# Patient Record
Sex: FEMALE | Race: BLACK OR AFRICAN AMERICAN | NOT HISPANIC OR LATINO | Employment: FULL TIME | URBAN - METROPOLITAN AREA
[De-identification: names, ages, dates, MRNs, and addresses within clinical notes are randomized per-mention and may not be internally consistent; named-entity substitution may affect disease eponyms.]

---

## 2022-07-25 LAB
ALBUMIN SERPL-MCNC: 3.5 G/DL (ref 3.4–5)
ALP SERPL-CCNC: 84 U/L (ref 40–150)
ALT SERPL W P-5'-P-CCNC: 18 U/L (ref 0–50)
ANION GAP SERPL CALCULATED.3IONS-SCNC: 4 MMOL/L (ref 3–14)
AST SERPL W P-5'-P-CCNC: 13 U/L (ref 0–45)
ATRIAL RATE - MUSE: 97 BPM
BASOPHILS # BLD AUTO: 0 10E3/UL (ref 0–0.2)
BASOPHILS NFR BLD AUTO: 0 %
BILIRUB SERPL-MCNC: 0.4 MG/DL (ref 0.2–1.3)
BUN SERPL-MCNC: 9 MG/DL (ref 7–30)
CALCIUM SERPL-MCNC: 8.8 MG/DL (ref 8.5–10.1)
CHLORIDE BLD-SCNC: 106 MMOL/L (ref 94–109)
CO2 SERPL-SCNC: 28 MMOL/L (ref 20–32)
CREAT SERPL-MCNC: 0.77 MG/DL (ref 0.52–1.04)
DIASTOLIC BLOOD PRESSURE - MUSE: NORMAL MMHG
EOSINOPHIL # BLD AUTO: 0.1 10E3/UL (ref 0–0.7)
EOSINOPHIL NFR BLD AUTO: 1 %
ERYTHROCYTE [DISTWIDTH] IN BLOOD BY AUTOMATED COUNT: 17.1 % (ref 10–15)
GFR SERPL CREATININE-BSD FRML MDRD: >90 ML/MIN/1.73M2
GLUCOSE BLD-MCNC: 118 MG/DL (ref 70–99)
HCG SERPL QL: NEGATIVE
HCT VFR BLD AUTO: 37.1 % (ref 35–47)
HGB BLD-MCNC: 11.6 G/DL (ref 11.7–15.7)
IMM GRANULOCYTES # BLD: 0 10E3/UL
IMM GRANULOCYTES NFR BLD: 0 %
INTERPRETATION ECG - MUSE: NORMAL
LYMPHOCYTES # BLD AUTO: 3.7 10E3/UL (ref 0.8–5.3)
LYMPHOCYTES NFR BLD AUTO: 32 %
MCH RBC QN AUTO: 24.5 PG (ref 26.5–33)
MCHC RBC AUTO-ENTMCNC: 31.3 G/DL (ref 31.5–36.5)
MCV RBC AUTO: 78 FL (ref 78–100)
MONOCYTES # BLD AUTO: 0.8 10E3/UL (ref 0–1.3)
MONOCYTES NFR BLD AUTO: 7 %
NEUTROPHILS # BLD AUTO: 6.9 10E3/UL (ref 1.6–8.3)
NEUTROPHILS NFR BLD AUTO: 60 %
NRBC # BLD AUTO: 0 10E3/UL
NRBC BLD AUTO-RTO: 0 /100
P AXIS - MUSE: 56 DEGREES
PLATELET # BLD AUTO: 337 10E3/UL (ref 150–450)
POTASSIUM BLD-SCNC: 3.5 MMOL/L (ref 3.4–5.3)
PR INTERVAL - MUSE: 166 MS
PROT SERPL-MCNC: 7.8 G/DL (ref 6.8–8.8)
QRS DURATION - MUSE: 72 MS
QT - MUSE: 342 MS
QTC - MUSE: 434 MS
R AXIS - MUSE: 32 DEGREES
RBC # BLD AUTO: 4.73 10E6/UL (ref 3.8–5.2)
SODIUM SERPL-SCNC: 138 MMOL/L (ref 133–144)
SYSTOLIC BLOOD PRESSURE - MUSE: NORMAL MMHG
T AXIS - MUSE: 30 DEGREES
TROPONIN I SERPL HS-MCNC: <3 NG/L
VENTRICULAR RATE- MUSE: 97 BPM
WBC # BLD AUTO: 11.5 10E3/UL (ref 4–11)

## 2022-07-25 PROCEDURE — 85025 COMPLETE CBC W/AUTO DIFF WBC: CPT | Performed by: EMERGENCY MEDICINE

## 2022-07-25 PROCEDURE — 80053 COMPREHEN METABOLIC PANEL: CPT | Performed by: EMERGENCY MEDICINE

## 2022-07-25 PROCEDURE — 84703 CHORIONIC GONADOTROPIN ASSAY: CPT | Performed by: EMERGENCY MEDICINE

## 2022-07-25 PROCEDURE — 99285 EMERGENCY DEPT VISIT HI MDM: CPT | Mod: 25

## 2022-07-25 PROCEDURE — 84484 ASSAY OF TROPONIN QUANT: CPT | Performed by: EMERGENCY MEDICINE

## 2022-07-25 PROCEDURE — 93005 ELECTROCARDIOGRAM TRACING: CPT

## 2022-07-25 PROCEDURE — 85379 FIBRIN DEGRADATION QUANT: CPT | Performed by: EMERGENCY MEDICINE

## 2022-07-25 PROCEDURE — 36415 COLL VENOUS BLD VENIPUNCTURE: CPT | Performed by: EMERGENCY MEDICINE

## 2022-07-26 ENCOUNTER — HOSPITAL ENCOUNTER (EMERGENCY)
Facility: CLINIC | Age: 27
Discharge: HOME | End: 2022-07-26
Attending: EMERGENCY MEDICINE | Admitting: EMERGENCY MEDICINE
Payer: COMMERCIAL

## 2022-07-26 ENCOUNTER — APPOINTMENT (OUTPATIENT)
Dept: GENERAL RADIOLOGY | Facility: CLINIC | Age: 27
End: 2022-07-26
Attending: EMERGENCY MEDICINE
Payer: COMMERCIAL

## 2022-07-26 VITALS
HEART RATE: 66 BPM | TEMPERATURE: 98.8 F | SYSTOLIC BLOOD PRESSURE: 169 MMHG | HEIGHT: 66 IN | OXYGEN SATURATION: 100 % | BODY MASS INDEX: 41.78 KG/M2 | RESPIRATION RATE: 16 BRPM | WEIGHT: 260 LBS | DIASTOLIC BLOOD PRESSURE: 104 MMHG

## 2022-07-26 DIAGNOSIS — R07.89 ATYPICAL CHEST PAIN: ICD-10-CM

## 2022-07-26 LAB
D DIMER PPP FEU-MCNC: 0.29 UG/ML FEU (ref 0–0.5)
HOLD SPECIMEN: NORMAL

## 2022-07-26 PROCEDURE — 250N000011 HC RX IP 250 OP 636: Performed by: EMERGENCY MEDICINE

## 2022-07-26 PROCEDURE — 71046 X-RAY EXAM CHEST 2 VIEWS: CPT

## 2022-07-26 PROCEDURE — 96374 THER/PROPH/DIAG INJ IV PUSH: CPT

## 2022-07-26 RX ORDER — KETOROLAC TROMETHAMINE 15 MG/ML
15 INJECTION, SOLUTION INTRAMUSCULAR; INTRAVENOUS ONCE
Status: COMPLETED | OUTPATIENT
Start: 2022-07-26 | End: 2022-07-26

## 2022-07-26 RX ADMIN — KETOROLAC TROMETHAMINE 15 MG: 15 INJECTION, SOLUTION INTRAMUSCULAR; INTRAVENOUS at 01:54

## 2022-07-26 ASSESSMENT — ENCOUNTER SYMPTOMS
FEVER: 0
COUGH: 0
NAUSEA: 1
BACK PAIN: 0
CHEST TIGHTNESS: 1
SHORTNESS OF BREATH: 1
MYALGIAS: 1
CHILLS: 0

## 2022-07-26 NOTE — ED TRIAGE NOTES
"Pt was sitting in a room with her mom when she started feeling tightness in her chest. Pt reports she  Felt \"faint\"     Triage Assessment     Row Name 07/25/22 7818       Triage Assessment (Adult)    Airway WDL WDL       Respiratory WDL    Respiratory WDL WDL       Skin Circulation/Temperature WDL    Skin Circulation/Temperature WDL WDL       Cardiac WDL    Cardiac WDL WDL       Peripheral/Neurovascular WDL    Peripheral Neurovascular WDL WDL       Cognitive/Neuro/Behavioral WDL    Cognitive/Neuro/Behavioral WDL WDL              "

## 2022-07-26 NOTE — ED PROVIDER NOTES
"  History   Chief Complaint:  Chest Pain       HPI   Brett Coon is a 26 year old female with no significant PMH who presents with chest pain. The patient reports that she was sitting in her mothers room around 2100 when she developed chest tightness that turned into a pressure in her mid upper chest that radiates up through her throat. She felt like she was going to pass out during the episode. She notes associated nausea, myalgias and some shortness of breath. The pain did not radiate into her back. She states that she has not been feeling well over the last few weeks. The patient denies cough, fever, chills or leg swelling. She is not on birth control. She quit smoking in November 2021.     Review of Systems   Constitutional: Negative for chills and fever.   Respiratory: Positive for chest tightness and shortness of breath. Negative for cough.    Cardiovascular: Positive for chest pain. Negative for leg swelling.   Gastrointestinal: Positive for nausea.   Musculoskeletal: Positive for myalgias. Negative for back pain.   All other systems reviewed and are negative.    Allergies:  The patient has no known allergies.     Medications:  The patient is currently on no regular medications.    Past Medical History:     Anemia  Vulvovaginitis   Morbid obesity     Family History:    Mother: DVT    Social History:  The patient presents to the ED alone  Former smoker  PCP: Adenike Farah MD    Physical Exam     Patient Vitals for the past 24 hrs:   BP Temp Temp src Pulse Resp SpO2 Height Weight   07/26/22 0230 -- -- -- -- -- 99 % -- --   07/26/22 0145 (!) 147/87 -- -- 86 -- -- -- --   07/25/22 2315 (!) 163/88 98.8  F (37.1  C) Temporal 117 18 100 % 1.676 m (5' 6\") 117.9 kg (260 lb)       Physical Exam  General: alert, lying comfortably on gurney  HENT: mucous membranes moist  CV: regular rate, regular rhythm, no murmurs rubs or gallops  Resp: normal effort, clear throughout, no crackles or wheezing  GI: abdomen " soft and nontender, no guarding  MSK: no bony tenderness, pain with palpation over the sternum  Skin: appropriately warm and dry  Extremities: no edema, calves non-tender  Neuro: alert, clear speech, oriented  Psych: normal mood and affect    Emergency Department Course   ECG  ECG results from 07/26/22   EKG 12-lead, tracing only     Value    Systolic Blood Pressure     Diastolic Blood Pressure     Ventricular Rate 97    Atrial Rate 97    KY Interval 166    QRS Duration 72        QTc 434    P Axis 56    R AXIS 32    T Axis 30    Interpretation ECG      Sinus rhythm  Possible Left atrial enlargement  Anterior infarct , age undetermined  Abnormal ECG  No previous ECGs available  Confirmed by GENERATED REPORT, COMPUTER ([id number]),  Aasen, Bradley ([id number]) on 7/25/2022 11:37:32 PM         Imaging:  Chest XR,  PA & LAT   Final Result   IMPRESSION: No focal airspace consolidation. No pleural effusion or pneumothorax.      Cardiomediastinal silhouette is normal.        Report per radiology    Laboratory:  Labs Ordered and Resulted from Time of ED Arrival to Time of ED Departure   COMPREHENSIVE METABOLIC PANEL - Abnormal       Result Value    Sodium 138      Potassium 3.5      Chloride 106      Carbon Dioxide (CO2) 28      Anion Gap 4      Urea Nitrogen 9      Creatinine 0.77      Calcium 8.8      Glucose 118 (*)     Alkaline Phosphatase 84      AST 13      ALT 18      Protein Total 7.8      Albumin 3.5      Bilirubin Total 0.4      GFR Estimate >90     CBC WITH PLATELETS AND DIFFERENTIAL - Abnormal    WBC Count 11.5 (*)     RBC Count 4.73      Hemoglobin 11.6 (*)     Hematocrit 37.1      MCV 78      MCH 24.5 (*)     MCHC 31.3 (*)     RDW 17.1 (*)     Platelet Count 337      % Neutrophils 60      % Lymphocytes 32      % Monocytes 7      % Eosinophils 1      % Basophils 0      % Immature Granulocytes 0      NRBCs per 100 WBC 0      Absolute Neutrophils 6.9      Absolute Lymphocytes 3.7      Absolute Monocytes 0.8       Absolute Eosinophils 0.1      Absolute Basophils 0.0      Absolute Immature Granulocytes 0.0      Absolute NRBCs 0.0     TROPONIN I - Normal    Troponin I High Sensitivity <3     HCG QUALITATIVE PREGNANCY - Normal    hCG Serum Qualitative Negative     D DIMER QUANTITATIVE - Normal    D-Dimer Quantitative 0.29          Emergency Department Course:             Reviewed:  I reviewed nursing notes, vitals, past medical history and Care Everywhere    Assessments:  0142 I obtained history and examined the patient as noted above.   0310 I rechecked the patient and explained findings.     Interventions:  0154 Toradol, 15 mg, IV    Disposition:  The patient was discharged to home.     Impression & Plan   Medical Decision Making:  Brett Coon is a 26 year old female who presents with chest pain.  On exam, she was initially hypertensive.  This is improved without intervention, though she continues to have mildly elevated blood pressures.  The work up in the Emergency Department is negative.  The differential diagnosis of chest pain is broad and includes life threatening etiologies such as acute coronary syndrome, myocardial infarction, pulmonary embolism, acute aortic dissection,  or esophageal rupture.  Other causes may include pneumonia, pneumothorax, pericarditis, myocarditis, pleurisy, esophageal spasm.  No serious etiology for the chest pain were detected today during this visit.  Given she is low risk, negative d-dimer was adequate to rule out PE.  She is very low risk for ACS, and given atypical symptoms, provocative testing is not indicated.  Close follow up with primary care is indicated should the pain continue, as further work up may be performed; this was made clear to the patient, who understands.     Diagnosis:    ICD-10-CM    1. Atypical chest pain  R07.89      Scribe Disclosure:  Dev RICO, am serving as a scribe at 1:03 AM on 7/26/2022 to document services personally performed by Delores Garcia  MD Hiral based on my observations and the provider's statements to me.            Delores Garcia MD  07/26/22 0752

## 2022-08-10 ENCOUNTER — HOSPITAL ENCOUNTER (EMERGENCY)
Facility: CLINIC | Age: 27
Discharge: HOME | End: 2022-08-10
Attending: EMERGENCY MEDICINE | Admitting: EMERGENCY MEDICINE
Payer: COMMERCIAL

## 2022-08-10 ENCOUNTER — APPOINTMENT (OUTPATIENT)
Dept: GENERAL RADIOLOGY | Facility: CLINIC | Age: 27
End: 2022-08-10
Attending: EMERGENCY MEDICINE
Payer: COMMERCIAL

## 2022-08-10 VITALS
TEMPERATURE: 97.1 F | HEART RATE: 76 BPM | RESPIRATION RATE: 25 BRPM | OXYGEN SATURATION: 98 % | DIASTOLIC BLOOD PRESSURE: 72 MMHG | SYSTOLIC BLOOD PRESSURE: 120 MMHG

## 2022-08-10 DIAGNOSIS — R06.02 SOB (SHORTNESS OF BREATH): ICD-10-CM

## 2022-08-10 DIAGNOSIS — R07.9 CHEST PAIN, UNSPECIFIED TYPE: ICD-10-CM

## 2022-08-10 LAB
ANION GAP SERPL CALCULATED.3IONS-SCNC: 6 MMOL/L (ref 3–14)
ATRIAL RATE - MUSE: 97 BPM
BASOPHILS # BLD AUTO: 0 10E3/UL (ref 0–0.2)
BASOPHILS NFR BLD AUTO: 0 %
BUN SERPL-MCNC: 6 MG/DL (ref 7–30)
CALCIUM SERPL-MCNC: 8.6 MG/DL (ref 8.5–10.1)
CHLORIDE BLD-SCNC: 103 MMOL/L (ref 94–109)
CO2 SERPL-SCNC: 27 MMOL/L (ref 20–32)
CREAT SERPL-MCNC: 0.64 MG/DL (ref 0.52–1.04)
D DIMER PPP FEU-MCNC: 0.36 UG/ML FEU (ref 0–0.5)
DIASTOLIC BLOOD PRESSURE - MUSE: NORMAL MMHG
EOSINOPHIL # BLD AUTO: 0 10E3/UL (ref 0–0.7)
EOSINOPHIL NFR BLD AUTO: 0 %
ERYTHROCYTE [DISTWIDTH] IN BLOOD BY AUTOMATED COUNT: 16.5 % (ref 10–15)
GFR SERPL CREATININE-BSD FRML MDRD: >90 ML/MIN/1.73M2
GLUCOSE BLD-MCNC: 105 MG/DL (ref 70–99)
HCG SERPL QL: NEGATIVE
HCT VFR BLD AUTO: 37 % (ref 35–47)
HGB BLD-MCNC: 11.8 G/DL (ref 11.7–15.7)
IMM GRANULOCYTES # BLD: 0 10E3/UL
IMM GRANULOCYTES NFR BLD: 0 %
INTERPRETATION ECG - MUSE: NORMAL
LYMPHOCYTES # BLD AUTO: 2.1 10E3/UL (ref 0.8–5.3)
LYMPHOCYTES NFR BLD AUTO: 31 %
MCH RBC QN AUTO: 24.6 PG (ref 26.5–33)
MCHC RBC AUTO-ENTMCNC: 31.9 G/DL (ref 31.5–36.5)
MCV RBC AUTO: 77 FL (ref 78–100)
MONOCYTES # BLD AUTO: 0.4 10E3/UL (ref 0–1.3)
MONOCYTES NFR BLD AUTO: 6 %
NEUTROPHILS # BLD AUTO: 4.3 10E3/UL (ref 1.6–8.3)
NEUTROPHILS NFR BLD AUTO: 63 %
NRBC # BLD AUTO: 0 10E3/UL
NRBC BLD AUTO-RTO: 0 /100
P AXIS - MUSE: 53 DEGREES
PLATELET # BLD AUTO: 382 10E3/UL (ref 150–450)
POTASSIUM BLD-SCNC: 3.5 MMOL/L (ref 3.4–5.3)
PR INTERVAL - MUSE: 164 MS
QRS DURATION - MUSE: 68 MS
QT - MUSE: 346 MS
QTC - MUSE: 439 MS
R AXIS - MUSE: 25 DEGREES
RBC # BLD AUTO: 4.79 10E6/UL (ref 3.8–5.2)
SODIUM SERPL-SCNC: 136 MMOL/L (ref 133–144)
SYSTOLIC BLOOD PRESSURE - MUSE: NORMAL MMHG
T AXIS - MUSE: 18 DEGREES
TROPONIN I SERPL HS-MCNC: <3 NG/L
VENTRICULAR RATE- MUSE: 97 BPM
WBC # BLD AUTO: 6.8 10E3/UL (ref 4–11)

## 2022-08-10 PROCEDURE — 82310 ASSAY OF CALCIUM: CPT | Performed by: EMERGENCY MEDICINE

## 2022-08-10 PROCEDURE — 36415 COLL VENOUS BLD VENIPUNCTURE: CPT | Performed by: EMERGENCY MEDICINE

## 2022-08-10 PROCEDURE — 84703 CHORIONIC GONADOTROPIN ASSAY: CPT | Performed by: EMERGENCY MEDICINE

## 2022-08-10 PROCEDURE — 85379 FIBRIN DEGRADATION QUANT: CPT | Performed by: EMERGENCY MEDICINE

## 2022-08-10 PROCEDURE — 85025 COMPLETE CBC W/AUTO DIFF WBC: CPT | Performed by: EMERGENCY MEDICINE

## 2022-08-10 PROCEDURE — 258N000003 HC RX IP 258 OP 636: Performed by: EMERGENCY MEDICINE

## 2022-08-10 PROCEDURE — 99285 EMERGENCY DEPT VISIT HI MDM: CPT | Mod: 25

## 2022-08-10 PROCEDURE — 71046 X-RAY EXAM CHEST 2 VIEWS: CPT

## 2022-08-10 PROCEDURE — 84484 ASSAY OF TROPONIN QUANT: CPT | Performed by: EMERGENCY MEDICINE

## 2022-08-10 PROCEDURE — 93005 ELECTROCARDIOGRAM TRACING: CPT

## 2022-08-10 RX ADMIN — SODIUM CHLORIDE 1000 ML: 9 INJECTION, SOLUTION INTRAVENOUS at 10:12

## 2022-08-10 ASSESSMENT — ENCOUNTER SYMPTOMS
VOMITING: 1
CONSTIPATION: 0
CHEST TIGHTNESS: 1
DIARRHEA: 0
SHORTNESS OF BREATH: 1

## 2022-08-10 ASSESSMENT — ACTIVITIES OF DAILY LIVING (ADL): ADLS_ACUITY_SCORE: 35

## 2022-08-10 NOTE — ED PROVIDER NOTES
History   Chief Complaint:  Chest Pain     HPI   Brett Coon is a 26 year old female with history of obesity who presents with chest pain. Two weeks ago, the patient developed chest tightness radiating into her throat and a feeling of presyncope. At that time her D-Dimer was WNL, her troponin was negative and she had a negative chest XR. Following this she was discharged. Her symptoms have been consistent since then. She has not yet made an appointment with her PCP. This morning, at 0500 when she woke up, she developed shortness of breath, dizziness and a tingling sensation. She had one episode of emesis without any subsequent nausea. Due to these symptoms she was brought here. She denies any diarrhea or constipation.     Review of Systems   Respiratory: Positive for chest tightness and shortness of breath.    Gastrointestinal: Positive for vomiting. Negative for constipation and diarrhea.   All other systems reviewed and are negative.    Allergies:  The patient has no known allergies.     Medications:  Vitamin D3  Ferrous sulfate    Past Medical History:    Morbid obesity  Anemia affecting pregnancy  Vitamin D deficiency  Vulvovaginitis due to yeast    Family History:    Mother: DVT    Social History:  The patient was unaccompanied to the ED.    Physical Exam     Patient Vitals for the past 24 hrs:   BP Temp Temp src Pulse Resp SpO2   08/10/22 1134 -- -- -- -- 25 98 %   08/10/22 1132 120/72 -- -- 76 -- --   08/10/22 1100 -- -- -- 73 (!) 0 99 %   08/10/22 1055 -- -- -- 75 8 99 %   08/10/22 1050 -- -- -- 80 -- 99 %   08/10/22 1045 -- -- -- 77 19 99 %   08/10/22 1040 -- -- -- 81 18 100 %   08/10/22 1025 -- -- -- 80 14 99 %   08/10/22 1015 119/83 -- -- 73 16 99 %   08/10/22 0925 135/88 97.1  F (36.2  C) Temporal 96 20 100 %       Physical Exam  General/Appearance: appears stated age, well-groomed, appears comfortable, elevated BMI   Eyes: EOMI, no scleral injection, no icterus  ENT: MMM  Neck: supple, nl ROM,  no stiffness  Cardiovascular: RRR, nl S1S2, no m/r/g, 2+ pulses in all 4 extremities, cap refill <2sec  Respiratory: CTAB, good air movement throughout, no wheezes/rhonchi/rales, no increased WOB, no retractions  GI: abd soft, non-distended, nttp,  no HSM, no rebound, no guarding, nl BS  MSK: PUGH, good tone, no bony abnormality  Skin: warm and well-perfused, no rash, no edema, no ecchymosis, nl turgor  Neuro: GCS 15, alert and oriented, no gross focal neuro deficits  Psych: interacts appropriately  Heme: no petechia, no purpura, no active bleeding    Emergency Department Course   ECG:  ECG taken at 0930, ECG read at 1140  Normal sinus rhythm  Possible Left atrial enlargement  Possible Anterior infarct, age undetermined  Abnormal ECG  Rate 97 bpm. NC interval 164 ms. QRS duration 68 ms. QT/QTc 346/439 ms. P-R-T axes 53 25 18.    Imaging:  XR Chest 2 Views   Preliminary Result   IMPRESSION: Since July 26, 2022, heart size remains upper limit of   normal. No pleural effusion, pneumothorax, or abnormal area of   consolidation.         Laboratory:  Labs Ordered and Resulted from Time of ED Arrival to Time of ED Departure   BASIC METABOLIC PANEL - Abnormal       Result Value    Sodium 136      Potassium 3.5      Chloride 103      Carbon Dioxide (CO2) 27      Anion Gap 6      Urea Nitrogen 6 (*)     Creatinine 0.64      Calcium 8.6      Glucose 105 (*)     GFR Estimate >90     CBC WITH PLATELETS AND DIFFERENTIAL - Abnormal    WBC Count 6.8      RBC Count 4.79      Hemoglobin 11.8      Hematocrit 37.0      MCV 77 (*)     MCH 24.6 (*)     MCHC 31.9      RDW 16.5 (*)     Platelet Count 382      % Neutrophils 63      % Lymphocytes 31      % Monocytes 6      % Eosinophils 0      % Basophils 0      % Immature Granulocytes 0      NRBCs per 100 WBC 0      Absolute Neutrophils 4.3      Absolute Lymphocytes 2.1      Absolute Monocytes 0.4      Absolute Eosinophils 0.0      Absolute Basophils 0.0      Absolute Immature Granulocytes  0.0      Absolute NRBCs 0.0     D DIMER QUANTITATIVE - Normal    D-Dimer Quantitative 0.36     TROPONIN I - Normal    Troponin I High Sensitivity <3     HCG QUALITATIVE PREGNANCY - Normal    hCG Serum Qualitative Negative        Emergency Department Course:     Reviewed:  I reviewed nursing notes, vitals, past medical history and care everywhere    Assessments:  0935 I obtained history and examined the patient as noted above.     1139 I rechecked and updated the patient regarding the laboratory results, imaging results and the plan for care.    Interventions:  1012 NS 1L IV Bolus    Disposition:  The patient was discharged to home.     Impression & Plan     Medical Decision Making:  This pt presents with chest pain of unclear etiology.  At this time I do not suspect that there is an acute/dangerous pathology for the chest pain.  They have no significant personal/family history of cardiac ds.  They have no significant cardiac risk factors.  Their EKG and CXR were unremarkable. Their trop was neg and I feel their risk is low-enough to not warrant delta trops.  I have also considered PE but they are PERC neg/have a neg DDimer. There are no focal infiltrates, effusions, pulm edema, or evidence of PTX on CXR. The pt has not had recent fevers or viral infections to suggest pericarditis.  They are at low risk for aortic pathology and have nl aortic contour on CXR.  They have not had recent chest trauma.  All of this was discussed with the pt and they were reassurred.  I have advised them to follow-up with their PCP in the next 3 days to get further evaluation, and to return to the ED sooner if their CP continues/worsens, they develop severe SOB/fevers/lightheadedness, or they develop any other new and concerning sxs. At this point the pt is HD stable and appropriate for discharge.    Diagnosis:    ICD-10-CM    1. Chest pain, unspecified type  R07.9    2. SOB (shortness of breath)  R06.02        Discharge Medications:  New  Prescriptions    No medications on file       Scribe Disclosure:  I, Robbi Harmon, am serving as a scribe at 9:35 AM on 8/10/2022 to document services personally performed by Kiersten Floyd MD based on my observations and the provider's statements to me.      Kiersten Floyd MD  08/10/22 3041

## 2022-08-10 NOTE — ED TRIAGE NOTES
Pt reports 3x weeks of mid sternal chest pressure. Seen at [facility] ED 07/26/2022 for similar symptoms.      Triage Assessment     Row Name 08/10/22 0925       Respiratory WDL    Rhythm/Pattern, Respiratory pattern regular;unlabored;depth regular       Novi Coma Scale    Best Eye Response 4-->(E4) spontaneous    Best Motor Response 6-->(M6) obeys commands    Best Verbal Response 5-->(V5) oriented    Novi Coma Scale Score 15

## 2022-08-10 NOTE — ED NOTES
"Patient with constant chest pressure for 3 weeks.  Dyspnea x 3 weeks.  Awoke today with lightheadedness, feels like she may \"pass out\", and vomiting this am.  Patient does report having drinks last noc, but this reaction unexpected.  "

## 2023-02-21 ENCOUNTER — HOSPITAL ENCOUNTER (EMERGENCY)
Facility: CLINIC | Age: 28
Discharge: LEFT BEFORE DISCHARGE | End: 2023-02-21
Payer: COMMERCIAL

## 2023-02-22 ENCOUNTER — HOSPITAL ENCOUNTER (EMERGENCY)
Facility: CLINIC | Age: 28
Discharge: HOME | End: 2023-02-22
Payer: COMMERCIAL

## 2023-02-22 ENCOUNTER — APPOINTMENT (OUTPATIENT)
Dept: GENERAL RADIOLOGY | Facility: CLINIC | Age: 28
End: 2023-02-22
Attending: EMERGENCY MEDICINE
Payer: COMMERCIAL

## 2023-02-22 VITALS
RESPIRATION RATE: 18 BRPM | OXYGEN SATURATION: 99 % | HEART RATE: 98 BPM | SYSTOLIC BLOOD PRESSURE: 154 MMHG | DIASTOLIC BLOOD PRESSURE: 88 MMHG | TEMPERATURE: 97.8 F | WEIGHT: 260 LBS | HEIGHT: 65 IN | BODY MASS INDEX: 43.32 KG/M2

## 2023-02-22 DIAGNOSIS — S93.402A SPRAIN OF LEFT ANKLE, UNSPECIFIED LIGAMENT, INITIAL ENCOUNTER: ICD-10-CM

## 2023-02-22 PROCEDURE — 99283 EMERGENCY DEPT VISIT LOW MDM: CPT

## 2023-02-22 PROCEDURE — 73610 X-RAY EXAM OF ANKLE: CPT | Mod: LT

## 2023-02-22 PROCEDURE — 250N000013 HC RX MED GY IP 250 OP 250 PS 637

## 2023-02-22 RX ORDER — IBUPROFEN 600 MG/1
600 TABLET, FILM COATED ORAL ONCE
Status: COMPLETED | OUTPATIENT
Start: 2023-02-22 | End: 2023-02-22

## 2023-02-22 RX ADMIN — IBUPROFEN 600 MG: 600 TABLET ORAL at 15:24

## 2023-02-22 ASSESSMENT — ACTIVITIES OF DAILY LIVING (ADL): ADLS_ACUITY_SCORE: 33

## 2023-02-22 NOTE — ED PROVIDER NOTES
"  History     Chief Complaint:  Ankle Pain       HPI   Brett Coon is an otherwise healthy 27 year old female who presents to the emergency department today for evaluation of left ankle pain after a fall yesterday.  The patient states she got out of her car at about 1 PM yesterday and slipped on an icy patch.  She states her left ankle bent weird.  She has had trouble walking on it since this happened.  She has tried Tylenol at home.  No past surgeries on that ankle.  Denies any numbness or tingling, color changes.  Denies any other injuries during this incident.  Did not hit head or lose consciousness.  No neck pain, back pain, hip pain, knee pain or foot pain.    Independent Historian:   None - Patient Only    Review of External Notes: Appears chart review that patient tried to go to the emergency department yesterday but was not seen.    ROS:  Review of Systems   A comprehensive ROS was obtained and is negative aside from those called out in the HPI above.    Allergies:  No Known Allergies     Medications:    Vitamin D  Ferrous sulfate    Past Medical History:    Vitamin D deficiency  Morbid obesity    Family History:    Mother - DVT    Social History:  The patient presents to the ED alone.  PCP: [facility]     Physical Exam     Patient Vitals for the past 24 hrs:   BP Temp Temp src Pulse Resp SpO2 Height Weight   02/22/23 1404 (!) 154/88 97.8  F (36.6  C) Temporal 98 18 99 % 1.651 m (5' 5\") 117.9 kg (260 lb)        Physical Exam  General: Pleasant adult female sitting in exam chair.  HENT:   Head: Atraumatic.  Mouth/Throat: Oropharynx clear and moist.  Eyes: Conjunctive and EOM normal.   Neck: Normal ROM. No rigidity.  Resp: Normal respiratory effort. No stridor or cough observed.  CV: DP pulse intact left foot and normal capillary refill.  MSK: Patient cannot ambulate normally secondary to pain in the left lower extremity.  Patient has no tenderness over the left knee, proximal " fibula, tibia, metatarsal bones of the foot.  Patient has focal tenderness and swelling over the left lateral malleolus.  Skin: Warm and dry.  No lacerations or wounds.  Neuro: Awake, alert.  Normal sensation to light touch left lower extremity.  Psych: Normal mood and affect.      Emergency Department Course     Imaging:  XR Ankle Left G/E 3 Views   Final Result   IMPRESSION: No fracture. Moderate soft tissue swelling along the   lateral aspect of the ankle. Ankle mortise is intact.      PORSCHE KHAN MD            SYSTEM ID:  [id number]         Report per radiology      Emergency Department Course & Assessments:             Interventions:  Medications   ibuprofen (ADVIL/MOTRIN) tablet 600 mg (has no administration in time range)        Independent Interpretation (X-rays, CTs, rhythm strip):  Viewed the patient's ankle x-ray.  Agree with radiology interpretation.  I do not note fracture.    Social Determinants of Health affecting care:   None    Assessments:  1508 I obtained history and examined the patient as noted above and explained findings.  Explained results to the patient.  She will get discharged home with a gel splint and crutches.    Disposition:  The patient was discharged to home.     Impression & Plan      Medical Decision Making:  Brett Coon is a 27 year old female who presents for evaluation of ankle pain after slipping on ice yesterday per HPI above.  On exam, she has tenderness to palpation and swelling over the left lateral malleolus.  X-ray obtained and negative for fracture.  Signs and symptoms are consistent with an ankle sprain.   No signs of septic arthritis, gout, pseudogout, fracture, cellulitis, etc.  The patients neurovascular status is normal. A head to toe trauma exam is otherwise negative; the likelihood of other serious sequelae of trauma (spine, head, chest, abdomen, other extremities, pelvis) is low.  Plan is for protected weightbearing, RICE treatment with ice 15  minutes on, 1 hour off, ace wrap.  Patient will advance weightbearing and follow-up with primary in 2-3 days.  They will begin gentle ROM exercises of the ankle including PF,DF, alphabet exercises.  Patient can follow-up with [facility] orthopedics for recheck.    Diagnosis:    ICD-10-CM    1. Sprain of left ankle, unspecified ligament, initial encounter  S93.402A            Scribe Disclosure:  I, Rosanna Rueda, am serving as a scribe at 3:05 PM on 2/22/2023 to document services personally performed by Ansley Tyson PA-C based on my observations and the provider's statements to me.     2/22/2023   Ansley Tyson PA-C Dewing, Jennifer C, PA-C  02/22/23 1521

## 2023-02-22 NOTE — Clinical Note
Brett Coon was seen and treated in our emergency department on 2/22/2023.  She may return to work on 02/23/2023.  Can return to work, but no prolonged standing since she is on crutches.     If you have any questions or concerns, please don't hesitate to call.      Ansley Tyson PA-C

## 2023-03-16 ENCOUNTER — DOCUMENTATION ONLY (OUTPATIENT)
Dept: EMERGENCY MEDICINE | Facility: CLINIC | Age: 28
End: 2023-03-16
Payer: COMMERCIAL

## 2023-03-16 DIAGNOSIS — Z53.9 ERRONEOUS ENCOUNTER--DISREGARD: ICD-10-CM

## 2023-03-16 DIAGNOSIS — S93.402A SPRAIN OF LEFT ANKLE, UNSPECIFIED LIGAMENT, INITIAL ENCOUNTER: Primary | ICD-10-CM

## 2023-03-24 ENCOUNTER — DOCUMENTATION ONLY (OUTPATIENT)
Dept: EMERGENCY MEDICINE | Facility: CLINIC | Age: 28
End: 2023-03-24
Payer: COMMERCIAL

## 2023-03-24 DIAGNOSIS — S93.402A SPRAIN OF LEFT ANKLE, UNSPECIFIED LIGAMENT, INITIAL ENCOUNTER: Primary | ICD-10-CM

## 2023-03-24 DIAGNOSIS — Z53.9 ERRONEOUS ENCOUNTER--DISREGARD: ICD-10-CM
